# Patient Record
Sex: FEMALE | Race: WHITE | NOT HISPANIC OR LATINO | Employment: FULL TIME | URBAN - METROPOLITAN AREA
[De-identification: names, ages, dates, MRNs, and addresses within clinical notes are randomized per-mention and may not be internally consistent; named-entity substitution may affect disease eponyms.]

---

## 2017-09-20 ENCOUNTER — HOSPITAL ENCOUNTER (EMERGENCY)
Facility: HOSPITAL | Age: 32
Discharge: HOME | End: 2017-09-20
Attending: EMERGENCY MEDICINE
Payer: OTHER MISCELLANEOUS

## 2017-09-20 VITALS
SYSTOLIC BLOOD PRESSURE: 139 MMHG | TEMPERATURE: 98.9 F | DIASTOLIC BLOOD PRESSURE: 63 MMHG | OXYGEN SATURATION: 97 % | HEIGHT: 62 IN | BODY MASS INDEX: 23.55 KG/M2 | HEART RATE: 95 BPM | RESPIRATION RATE: 18 BRPM | WEIGHT: 128 LBS

## 2017-09-20 DIAGNOSIS — H57.89 IRRITATION OF RIGHT EYE: Primary | ICD-10-CM

## 2017-09-20 PROCEDURE — 99283 EMERGENCY DEPT VISIT LOW MDM: CPT

## 2017-09-20 RX ORDER — CIPROFLOXACIN HYDROCHLORIDE 3.5 MG/ML
2 SOLUTION/ DROPS TOPICAL EVERY 4 HOURS
Qty: 5 ML | Refills: 0 | Status: SHIPPED | OUTPATIENT
Start: 2017-09-20 | End: 2017-09-27

## 2019-07-19 ENCOUNTER — TRANSCRIBE ORDERS (OUTPATIENT)
Dept: ADMINISTRATIVE | Facility: HOSPITAL | Age: 34
End: 2019-07-19

## 2019-07-19 ENCOUNTER — APPOINTMENT (OUTPATIENT)
Dept: LAB | Facility: HOSPITAL | Age: 34
End: 2019-07-19
Attending: INTERNAL MEDICINE
Payer: COMMERCIAL

## 2019-07-19 DIAGNOSIS — R79.89 HYPOURICEMIA: Primary | ICD-10-CM

## 2019-07-19 DIAGNOSIS — R79.89 HYPOURICEMIA: ICD-10-CM

## 2019-07-19 LAB
BACTERIA UR QL AUTO: ABNORMAL /HPF
BILIRUB UR QL STRIP: NEGATIVE
CLARITY UR: CLEAR
COLOR UR: YELLOW
GLUCOSE UR STRIP-MCNC: NEGATIVE MG/DL
HGB UR QL STRIP.AUTO: NEGATIVE
KETONES UR STRIP-MCNC: NEGATIVE MG/DL
LEUKOCYTE ESTERASE UR QL STRIP: ABNORMAL
NITRITE UR QL STRIP: NEGATIVE
NON-SQ EPI CELLS URNS QL MICRO: ABNORMAL /HPF
PH UR STRIP.AUTO: 7.5 [PH]
PROT UR STRIP-MCNC: NEGATIVE MG/DL
RBC #/AREA URNS AUTO: ABNORMAL /HPF
SP GR UR STRIP.AUTO: <=1.005 (ref 1–1.03)
UROBILINOGEN UR QL STRIP.AUTO: 0.2 E.U./DL
WBC #/AREA URNS AUTO: ABNORMAL /HPF

## 2019-07-19 PROCEDURE — 84166 PROTEIN E-PHORESIS/URINE/CSF: CPT | Performed by: PATHOLOGY

## 2019-07-19 PROCEDURE — 84165 PROTEIN E-PHORESIS SERUM: CPT | Performed by: PATHOLOGY

## 2019-07-19 PROCEDURE — 81001 URINALYSIS AUTO W/SCOPE: CPT | Performed by: INTERNAL MEDICINE

## 2019-07-19 PROCEDURE — 84166 PROTEIN E-PHORESIS/URINE/CSF: CPT | Performed by: INTERNAL MEDICINE

## 2019-07-19 PROCEDURE — 84165 PROTEIN E-PHORESIS SERUM: CPT

## 2019-07-19 PROCEDURE — 36415 COLL VENOUS BLD VENIPUNCTURE: CPT

## 2019-07-23 LAB
ALBUMIN SERPL ELPH-MCNC: 4.34 G/DL (ref 3.5–5)
ALBUMIN SERPL ELPH-MCNC: 65.7 % (ref 52–65)
ALBUMIN UR ELPH-MCNC: 100 %
ALPHA1 GLOB MFR UR ELPH: 0 %
ALPHA1 GLOB SERPL ELPH-MCNC: 0.3 G/DL (ref 0.1–0.4)
ALPHA1 GLOB SERPL ELPH-MCNC: 4.5 % (ref 2.5–5)
ALPHA2 GLOB MFR UR ELPH: 0 %
ALPHA2 GLOB SERPL ELPH-MCNC: 0.63 G/DL (ref 0.4–1.2)
ALPHA2 GLOB SERPL ELPH-MCNC: 9.6 % (ref 7–13)
B-GLOBULIN MFR UR ELPH: 0 %
BETA GLOB ABNORMAL SERPL ELPH-MCNC: 0.38 G/DL (ref 0.4–0.8)
BETA1 GLOB SERPL ELPH-MCNC: 5.8 % (ref 5–13)
BETA2 GLOB SERPL ELPH-MCNC: 3.2 % (ref 2–8)
BETA2+GAMMA GLOB SERPL ELPH-MCNC: 0.21 G/DL (ref 0.2–0.5)
GAMMA GLOB ABNORMAL SERPL ELPH-MCNC: 0.74 G/DL (ref 0.5–1.6)
GAMMA GLOB MFR UR ELPH: 0 %
GAMMA GLOB SERPL ELPH-MCNC: 11.2 % (ref 12–22)
IGG/ALB SER: 1.92 {RATIO} (ref 1.1–1.8)
PROT PATTERN SERPL ELPH-IMP: ABNORMAL
PROT PATTERN UR ELPH-IMP: NORMAL
PROT SERPL-MCNC: 6.6 G/DL (ref 6.4–8.2)
PROT UR-MCNC: 6 MG/DL

## 2019-08-22 ENCOUNTER — APPOINTMENT (OUTPATIENT)
Dept: LAB | Facility: HOSPITAL | Age: 34
End: 2019-08-22
Attending: INTERNAL MEDICINE
Payer: COMMERCIAL

## 2019-08-22 ENCOUNTER — TRANSCRIBE ORDERS (OUTPATIENT)
Dept: ADMINISTRATIVE | Facility: HOSPITAL | Age: 34
End: 2019-08-22

## 2019-08-22 DIAGNOSIS — R31.21 ASYMPTOMATIC MICROSCOPIC HEMATURIA: Primary | ICD-10-CM

## 2019-08-22 DIAGNOSIS — R31.21 ASYMPTOMATIC MICROSCOPIC HEMATURIA: ICD-10-CM

## 2019-08-22 LAB
BILIRUB UR QL STRIP: NEGATIVE
CLARITY UR: CLEAR
COLOR UR: YELLOW
GLUCOSE UR STRIP-MCNC: NEGATIVE MG/DL
HGB UR QL STRIP.AUTO: NEGATIVE
KETONES UR STRIP-MCNC: NEGATIVE MG/DL
LEUKOCYTE ESTERASE UR QL STRIP: NEGATIVE
NITRITE UR QL STRIP: NEGATIVE
PH UR STRIP.AUTO: 8 [PH]
PROT UR STRIP-MCNC: NEGATIVE MG/DL
SP GR UR STRIP.AUTO: 1.01 (ref 1–1.03)
UROBILINOGEN UR QL STRIP.AUTO: 0.2 E.U./DL

## 2019-08-22 PROCEDURE — 87086 URINE CULTURE/COLONY COUNT: CPT

## 2019-08-22 PROCEDURE — 81003 URINALYSIS AUTO W/O SCOPE: CPT | Performed by: INTERNAL MEDICINE

## 2019-08-23 LAB — BACTERIA UR CULT: NORMAL

## 2019-09-30 ENCOUNTER — TELEPHONE (OUTPATIENT)
Dept: HEMATOLOGY ONCOLOGY | Facility: HOSPITAL | Age: 34
End: 2019-09-30

## 2019-09-30 DIAGNOSIS — D80.1 AGAMMAGLOBULINEMIA (HCC): Primary | ICD-10-CM

## 2019-09-30 NOTE — TELEPHONE ENCOUNTER
Called but I couldn't reach the pt as no one picked up and no voice mail was set up  I called to see if the patient can have labs drawn prior to her appt with Dr Alfie Sesay tomorrow 10/1  Please noted we will not reschedule her appt as she is a new pt

## 2019-10-01 ENCOUNTER — CONSULT (OUTPATIENT)
Dept: HEMATOLOGY ONCOLOGY | Facility: CLINIC | Age: 34
End: 2019-10-01
Payer: COMMERCIAL

## 2019-10-01 VITALS
RESPIRATION RATE: 16 BRPM | HEART RATE: 82 BPM | DIASTOLIC BLOOD PRESSURE: 76 MMHG | OXYGEN SATURATION: 99 % | BODY MASS INDEX: 26.5 KG/M2 | HEIGHT: 62 IN | SYSTOLIC BLOOD PRESSURE: 114 MMHG | TEMPERATURE: 99.7 F | WEIGHT: 144 LBS

## 2019-10-01 DIAGNOSIS — D50.0 IRON DEFICIENCY ANEMIA DUE TO CHRONIC BLOOD LOSS: Primary | ICD-10-CM

## 2019-10-01 PROCEDURE — 99245 OFF/OP CONSLTJ NEW/EST HI 55: CPT | Performed by: INTERNAL MEDICINE

## 2019-10-01 RX ORDER — DIPHENOXYLATE HYDROCHLORIDE AND ATROPINE SULFATE 2.5; .025 MG/1; MG/1
1 TABLET ORAL DAILY
COMMUNITY

## 2019-10-01 NOTE — PROGRESS NOTES
Oncology Outpatient Consult Note  Gerard Nicole 29 y o  female MRN: @ Encounter: [id number]        Date:  10/1/2019        CC:  Abnormal A by G ratio  HPI:  Gerard Nicole is seen for initial consultation 10/1/2019 regarding An abnormal albumin by globulin ratio  She had an SPEP with immunofixation along with a UPEP with immunofixation which showed no M spike  She has not had a CBC with differential or CMP  She has also not had any of the other accompanying blood work  She states her blood work was drawn because she was fatigued  Denies any nausea denies any vomiting  Denies any bone pain  Apart from fatigue she really has no other complaints and the rest of her 14 point review of systems today was negative  Test Results:      Lab Results   Component Value Date    SPEP  07/19/2019     No monoclonal bands noted  Reviewed by: Any Rinaldi MD, PhD ([id number]) **Electronic Signature**    UPEP  07/19/2019     No monoclonal bands noted  Reviewed by: Any Rinaldi MD, PhD ([id number])  **Electronic Signature**       ROS: As stated in history of present illness otherwise her 14 point review of systems today was negative          Active Problems: Vitamin D Deficiency    Past Medical: Noncontributory    Surgical History: Non contributory    Family History:  Noncontributory    Social History:   Social History     Socioeconomic History    Marital status: Single     Spouse name: Not on file    Number of children: Not on file    Years of education: Not on file    Highest education level: Not on file   Occupational History    Not on file   Social Needs    Financial resource strain: Not on file    Food insecurity:     Worry: Not on file     Inability: Not on file    Transportation needs:     Medical: Not on file     Non-medical: Not on file   Tobacco Use    Smoking status: Never Smoker    Smokeless tobacco: Never Used   Substance and Sexual Activity    Alcohol use: No    Drug use: Not on file    Sexual activity: Not on file   Lifestyle    Physical activity:     Days per week: Not on file     Minutes per session: Not on file    Stress: Not on file   Relationships    Social connections:     Talks on phone: Not on file     Gets together: Not on file     Attends [employer] service: Not on file     Active member of club or organization: Not on file     Attends meetings of clubs or organizations: Not on file     Relationship status: Not on file    Intimate partner violence:     Fear of current or ex partner: Not on file     Emotionally abused: Not on file     Physically abused: Not on file     Forced sexual activity: Not on file   Other Topics Concern    Not on file   Social History Narrative    Not on file       Current Medications:   Current Outpatient Medications   Medication Sig Dispense Refill    cholecalciferol (VITAMIN D3) 1,000 units tablet Vitamin D   800 units daily      cyanocobalamin ([employer] VITAMIN B-12) 1000 MCG tablet Take 100 mcg by mouth daily      multivitamin (THERAGRAN) TABS Take 1 tablet by mouth daily       No current facility-administered medications for this visit  Allergies: No Known Allergies      Physical Exam:    Body surface area is 1 66 meters squared  Wt Readings from Last 3 Encounters:   10/01/19 65 3 kg (144 lb)   09/20/17 58 1 kg (128 lb)   04/10/15 73 3 kg (161 lb 8 oz)        Temp Readings from Last 3 Encounters:   10/01/19 99 7 °F (37 6 °C) (Oral)   09/20/17 98 9 °F (37 2 °C) (Oral)        BP Readings from Last 3 Encounters:   10/01/19 114/76   09/20/17 139/63   04/10/15 122/68         Pulse Readings from Last 3 Encounters:   10/01/19 82   09/20/17 95       Physical Exam     Constitutional   General appearance: No acute distress, well appearing and well nourished  Eyes   Conjunctiva and lids: No swelling, erythema or discharge  Pupils and irises: Equal, round and reactive to light      Ears, Nose, Mouth, and Throat   External inspection of ears and nose: Normal  Nasal mucosa, septum, and turbinates: Normal without edema or erythema  Oropharynx: Normal with no erythema, edema, exudate or lesions  Pulmonary   Respiratory effort: No increased work of breathing or signs of respiratory distress  Auscultation of lungs: Clear to auscultation  Cardiovascular   Palpation of heart: Normal PMI, no thrills  Auscultation of heart: Normal rate and rhythm, normal S1 and S2, without murmurs  Examination of extremities for edema and/or varicosities: Normal     Carotid pulses: Normal     Abdomen   Abdomen: Non-tender, no masses  Liver and spleen: No hepatomegaly or splenomegaly  Lymphatic   Palpation of lymph nodes in neck: No lymphadenopathy  Musculoskeletal   Gait and station: Normal     Digits and nails: Normal without clubbing or cyanosis  Inspection/palpation of joints, bones, and muscles: Normal     Skin   Skin and subcutaneous tissue: Normal without rashes or lesions  Neurologic   Cranial nerves: Cranial nerves 2-12 intact  Sensation: No sensory loss  Psychiatric   Orientation to person, place, and time: Normal     Mood and affect: Normal           Assessment/ Plan:    The patient is a pleasant 26-year-old female who was referred to see us for a slightly abnormal serum protein electrophoresis with an elevated albumin by globulin ratio  The patient's albumin was slightly elevated at 65 7  Urine protein electrophoresis and serum protein PHERESIS did not show an M spike  I do not have a baseline CBC or CMP  I will order this  I will also order a myeloma workup and iron studies because the patient is a young woman with fatigue  She states she is going to see a rheumatologist soon also  If the testing comes back negative we will discharge her back to the care of her primary care physician  If however the immunoglobulin levels or free light chain levels show any abnormalities we may consider a bone marrow biopsy   My clinical suspicion for hematologic disorder is low  I will however wait for the bloodwork  Iron study should help delineate if any of her fatigue is secondary to iron deficiency  The patient and her mother are in agreement with the plan  I'll see her back in 4 weeks with results of blood work  Goals and Barriers:  Current Goal: Prolong Survival from Slightly elevated albumin  Barriers: None  Patient's Capacity to Self Care:  Patient able to self care  Portions of the record may have been created with voice recognition software   Occasional wrong word or "sound a like" substitutions may have occurred due to the inherent limitations of voice recognition software   Read the chart carefully and recognize, using context, where substitutions have occurred

## 2019-10-18 ENCOUNTER — OFFICE VISIT (OUTPATIENT)
Dept: DERMATOLOGY | Facility: CLINIC | Age: 34
End: 2019-10-18
Payer: COMMERCIAL

## 2019-10-18 DIAGNOSIS — Z13.89 SCREENING FOR SKIN CONDITION: ICD-10-CM

## 2019-10-18 DIAGNOSIS — D22.9 NEVUS: Primary | ICD-10-CM

## 2019-10-18 PROCEDURE — 88305 TISSUE EXAM BY PATHOLOGIST: CPT | Performed by: PATHOLOGY

## 2019-10-18 PROCEDURE — 99203 OFFICE O/P NEW LOW 30 MIN: CPT | Performed by: DERMATOLOGY

## 2019-10-18 PROCEDURE — 11310 SHAVE SKIN LESION 0.5 CM/<: CPT | Performed by: DERMATOLOGY

## 2019-10-18 NOTE — PATIENT INSTRUCTIONS
Nevi reviewed the concept of ABCDE and ugly duckling nothing markedly atypical patient reassured  Mole on cutaneous lip was removed because the patient concern and change  Screening for Dermatologic Disorders: Nothing else of concern noted on complete exam follow up in 1 year   Wound care instructions given to patient

## 2019-10-18 NOTE — PROGRESS NOTES
[facility] DERMATOLOGY  [address] [place] [id number]  [phone]  [phone]     MRN: [id number] : 1985  Encounter: [id number]  Patient Information: Kenya Edmonds  Chief complaint:  Growing mole an overall skin check    History of present illness:  A 60-year-old female presents for overall skin check for concerns regarding moles and growths patient concerned regarding 1 on her left cutaneous lip that has been present for awhile that appears to be getting larger  No past medical history on file  No past surgical history on file  Social History   Social History     Substance and Sexual Activity   Alcohol Use No     Social History     Substance and Sexual Activity   Drug Use Not on file     Social History     Tobacco Use   Smoking Status Never Smoker   Smokeless Tobacco Never Used     No family history on file  Meds/Allergies   No Known Allergies    Meds:  Prior to Admission medications    Medication Sig Start Date End Date Taking?  Authorizing Provider   cholecalciferol (VITAMIN D3) 1,000 units tablet Vitamin D   800 units daily   Yes Historical Provider, MD   cyanocobalamin ([employer] VITAMIN B-12) 1000 MCG tablet Take 100 mcg by mouth daily   Yes Historical Provider, MD   multivitamin (THERAGRAN) TABS Take 1 tablet by mouth daily   Yes Historical Provider, MD       Subjective:     Review of Systems:    General: negative for - chills, fatigue, fever,  weight gain or weight loss  Psychological: negative for - anxiety, behavioral disorder, concentration difficulties, decreased libido, depression, irritability, memory difficulties, mood swings, sleep disturbances or suicidal ideation  ENT: negative for - hearing difficulties , nasal congestion, nasal discharge, oral lesions, sinus pain, sneezing, sore throat  Allergy and Immunology: negative for - hives, insect bite sensitivity,  Hematological and Lymphatic: negative for - bleeding problems, blood clots,bruising, swollen lymph nodes  Endocrine: negative for - hair pattern changes, hot flashes, malaise/lethargy, mood swings, palpitations, polydipsia/polyuria, skin changes, temperature intolerance or unexpected weight change  Respiratory: negative for - cough, hemoptysis, orthopnea, shortness of breath, or wheezing  Cardiovascular: negative for - chest pain, dyspnea on exertion, edema,  Gastrointestinal: negative for - abdominal pain, nausea/vomiting  Genito-Urinary: negative for - dysuria, incontinence, irregular/heavy menses or urinary frequency/urgency  Musculoskeletal: negative for - gait disturbance, joint pain, joint stiffness, joint swelling, muscle pain, muscular weakness  Dermatological:  As in HPI  Neurological: negative for confusion, dizziness, headaches, impaired coordination/balance, memory loss, numbness/tingling, seizures, speech problems, tremors or weakness       Objective: There were no vitals taken for this visit  Physical Exam:    General Appearance:    Alert, cooperative, no distress   Head:    Normocephalic, without obvious abnormality, atraumatic           Skin:   A full skin exam was performed including scalp, head scalp, eyes, ears, nose, lips, neck, chest, axilla, abdomen, back, buttocks, bilateral upper extremities, bilateral lower extremities, hands, feet, fingers, toes, fingernails, and toenails normal pigmented lesions all regular shape and color 5 mm pigmented area with some erythema noted on the left cutaneous lip nothing else remarkable noted on complete exam      Shave excision Procedure Note    Pre-operative Diagnosis:  Irritated nevus    Plan:  1  Instructed to keep the wound dry and covered for 24 and clean thereafter  2  Warning signs of infection were reviewed  3  Recommended that the patient use OTC acetaminophen as needed for pain       Locations:  Left cutaneous lip  Indications:  Growing lesion    Anesthesia: Lidocaine 1% with epinephrine without added sodium bicarbonate    Procedure Details     Patient informed of the risks (including bleeding and infection) and benefits of the   procedure and Verbal informed consent obtained  The lesion and surrounding area were prepped using alcohol  A Blue blade razor was used to remove the lesion  Hemostasis achieved with aluminum chloride  Petrolatum and a sterile dressing applied  The specimen was sent for pathologic examination  The patient tolerated the procedure(s) well  Complications:  none  Assessment:     1  Nevus     2  Screening for skin condition           Plan:   Nevi reviewed the concept of ABCDE and ugly duckling nothing markedly atypical patient reassured  Mole on cutaneous lip was removed because the patient concern and change  Screening for Dermatologic Disorders: Nothing else of concern noted on complete exam follow up in 1 year       Kory Rojas MD  10/18/2019,12:44 PM    Portions of the record may have been created with voice recognition software   Occasional wrong word or "sound a like" substitutions may have occurred due to the inherent limitations of voice recognition software   Read the chart carefully and recognize, using context, where substitutions have occurred

## 2019-10-22 ENCOUNTER — APPOINTMENT (OUTPATIENT)
Dept: LAB | Facility: HOSPITAL | Age: 34
End: 2019-10-22
Attending: INTERNAL MEDICINE
Payer: COMMERCIAL

## 2019-10-22 ENCOUNTER — TELEPHONE (OUTPATIENT)
Dept: DERMATOLOGY | Facility: CLINIC | Age: 34
End: 2019-10-22

## 2019-10-22 DIAGNOSIS — D80.1 AGAMMAGLOBULINEMIA (HCC): ICD-10-CM

## 2019-10-22 DIAGNOSIS — D50.0 IRON DEFICIENCY ANEMIA DUE TO CHRONIC BLOOD LOSS: ICD-10-CM

## 2019-10-22 LAB
ALBUMIN SERPL BCP-MCNC: 3.8 G/DL (ref 3.5–5)
ALP SERPL-CCNC: 41 U/L (ref 46–116)
ALT SERPL W P-5'-P-CCNC: 17 U/L (ref 12–78)
ANION GAP SERPL CALCULATED.3IONS-SCNC: 9 MMOL/L (ref 4–13)
AST SERPL W P-5'-P-CCNC: 7 U/L (ref 5–45)
BASOPHILS # BLD AUTO: 0.05 THOUSANDS/ΜL (ref 0–0.1)
BASOPHILS NFR BLD AUTO: 1 % (ref 0–1)
BILIRUB SERPL-MCNC: 0.4 MG/DL (ref 0.2–1)
BUN SERPL-MCNC: 10 MG/DL (ref 5–25)
CALCIUM SERPL-MCNC: 8.8 MG/DL (ref 8.3–10.1)
CHLORIDE SERPL-SCNC: 104 MMOL/L (ref 100–108)
CO2 SERPL-SCNC: 28 MMOL/L (ref 21–32)
CREAT SERPL-MCNC: 0.7 MG/DL (ref 0.6–1.3)
EOSINOPHIL # BLD AUTO: 0 THOUSAND/ΜL (ref 0–0.61)
EOSINOPHIL NFR BLD AUTO: 0 % (ref 0–6)
ERYTHROCYTE [DISTWIDTH] IN BLOOD BY AUTOMATED COUNT: 12.6 % (ref 11.6–15.1)
FERRITIN SERPL-MCNC: 41 NG/ML (ref 8–388)
GFR SERPL CREATININE-BSD FRML MDRD: 113 ML/MIN/1.73SQ M
GLUCOSE P FAST SERPL-MCNC: 90 MG/DL (ref 65–99)
HCT VFR BLD AUTO: 43.1 % (ref 34.8–46.1)
HGB BLD-MCNC: 14 G/DL (ref 11.5–15.4)
IGA SERPL-MCNC: 82 MG/DL (ref 70–400)
IGG SERPL-MCNC: 656 MG/DL (ref 700–1600)
IGM SERPL-MCNC: 131 MG/DL (ref 40–230)
IMM GRANULOCYTES # BLD AUTO: 0.02 THOUSAND/UL (ref 0–0.2)
IMM GRANULOCYTES NFR BLD AUTO: 0 % (ref 0–2)
IRON SATN MFR SERPL: 22 %
IRON SERPL-MCNC: 66 UG/DL (ref 50–170)
LYMPHOCYTES # BLD AUTO: 1.72 THOUSANDS/ΜL (ref 0.6–4.47)
LYMPHOCYTES NFR BLD AUTO: 32 % (ref 14–44)
MCH RBC QN AUTO: 30.8 PG (ref 26.8–34.3)
MCHC RBC AUTO-ENTMCNC: 32.5 G/DL (ref 31.4–37.4)
MCV RBC AUTO: 95 FL (ref 82–98)
MONOCYTES # BLD AUTO: 0.43 THOUSAND/ΜL (ref 0.17–1.22)
MONOCYTES NFR BLD AUTO: 8 % (ref 4–12)
NEUTROPHILS # BLD AUTO: 3.18 THOUSANDS/ΜL (ref 1.85–7.62)
NEUTS SEG NFR BLD AUTO: 59 % (ref 43–75)
NRBC BLD AUTO-RTO: 0 /100 WBCS
PLATELET # BLD AUTO: 199 THOUSANDS/UL (ref 149–390)
PMV BLD AUTO: 12.6 FL (ref 8.9–12.7)
POTASSIUM SERPL-SCNC: 4.2 MMOL/L (ref 3.5–5.3)
PROT SERPL-MCNC: 6.8 G/DL (ref 6.4–8.2)
RBC # BLD AUTO: 4.54 MILLION/UL (ref 3.81–5.12)
SODIUM SERPL-SCNC: 141 MMOL/L (ref 136–145)
TIBC SERPL-MCNC: 306 UG/DL (ref 250–450)
WBC # BLD AUTO: 5.4 THOUSAND/UL (ref 4.31–10.16)

## 2019-10-22 PROCEDURE — 83550 IRON BINDING TEST: CPT

## 2019-10-22 PROCEDURE — 83540 ASSAY OF IRON: CPT

## 2019-10-22 PROCEDURE — 84165 PROTEIN E-PHORESIS SERUM: CPT | Performed by: INTERNAL MEDICINE

## 2019-10-22 PROCEDURE — 82784 ASSAY IGA/IGD/IGG/IGM EACH: CPT

## 2019-10-22 PROCEDURE — 82232 ASSAY OF BETA-2 PROTEIN: CPT | Performed by: INTERNAL MEDICINE

## 2019-10-22 PROCEDURE — 36415 COLL VENOUS BLD VENIPUNCTURE: CPT | Performed by: INTERNAL MEDICINE

## 2019-10-22 PROCEDURE — 85025 COMPLETE CBC W/AUTO DIFF WBC: CPT | Performed by: INTERNAL MEDICINE

## 2019-10-22 PROCEDURE — 83883 ASSAY NEPHELOMETRY NOT SPEC: CPT | Performed by: INTERNAL MEDICINE

## 2019-10-22 PROCEDURE — 80053 COMPREHEN METABOLIC PANEL: CPT | Performed by: INTERNAL MEDICINE

## 2019-10-22 PROCEDURE — 82728 ASSAY OF FERRITIN: CPT

## 2019-10-22 PROCEDURE — 83918 ORGANIC ACIDS TOTAL QUANT: CPT

## 2019-10-22 PROCEDURE — 84165 PROTEIN E-PHORESIS SERUM: CPT | Performed by: PATHOLOGY

## 2019-10-22 NOTE — TELEPHONE ENCOUNTER
----- Message from Génesis Ivy MD sent at 10/22/2019 12:53 PM EDT -----  Please call her of normal pathology

## 2019-10-23 LAB
KAPPA LC FREE SER-MCNC: 8.6 MG/L (ref 3.3–19.4)
KAPPA LC FREE/LAMBDA FREE SER: 0.82 {RATIO} (ref 0.26–1.65)
LAMBDA LC FREE SERPL-MCNC: 10.5 MG/L (ref 5.7–26.3)

## 2019-10-24 LAB
ALBUMIN SERPL ELPH-MCNC: 4.33 G/DL (ref 3.5–5)
ALBUMIN SERPL ELPH-MCNC: 64.7 % (ref 52–65)
ALPHA1 GLOB SERPL ELPH-MCNC: 0.31 G/DL (ref 0.1–0.4)
ALPHA1 GLOB SERPL ELPH-MCNC: 4.7 % (ref 2.5–5)
ALPHA2 GLOB SERPL ELPH-MCNC: 0.64 G/DL (ref 0.4–1.2)
ALPHA2 GLOB SERPL ELPH-MCNC: 9.6 % (ref 7–13)
B2 MICROGLOB SERPL-MCNC: 1.3 MG/L (ref 0.6–2.4)
BETA GLOB ABNORMAL SERPL ELPH-MCNC: 0.4 G/DL (ref 0.4–0.8)
BETA1 GLOB SERPL ELPH-MCNC: 6 % (ref 5–13)
BETA2 GLOB SERPL ELPH-MCNC: 3.4 % (ref 2–8)
BETA2+GAMMA GLOB SERPL ELPH-MCNC: 0.23 G/DL (ref 0.2–0.5)
GAMMA GLOB ABNORMAL SERPL ELPH-MCNC: 0.78 G/DL (ref 0.5–1.6)
GAMMA GLOB SERPL ELPH-MCNC: 11.6 % (ref 12–22)
IGG/ALB SER: 1.83 {RATIO} (ref 1.1–1.8)
METHYLMALONATE SERPL-SCNC: 89 NMOL/L (ref 0–378)
PROT PATTERN SERPL ELPH-IMP: ABNORMAL
PROT SERPL-MCNC: 6.7 G/DL (ref 6.4–8.2)
SL AMB DISCLAIMER: NORMAL

## 2019-11-05 ENCOUNTER — OFFICE VISIT (OUTPATIENT)
Dept: HEMATOLOGY ONCOLOGY | Facility: CLINIC | Age: 34
End: 2019-11-05
Payer: COMMERCIAL

## 2019-11-05 VITALS
BODY MASS INDEX: 26.87 KG/M2 | HEART RATE: 96 BPM | TEMPERATURE: 99.1 F | DIASTOLIC BLOOD PRESSURE: 74 MMHG | SYSTOLIC BLOOD PRESSURE: 106 MMHG | RESPIRATION RATE: 16 BRPM | WEIGHT: 146 LBS | HEIGHT: 62 IN | OXYGEN SATURATION: 99 %

## 2019-11-05 DIAGNOSIS — D80.1 AGAMMAGLOBULINEMIA (HCC): Primary | ICD-10-CM

## 2019-11-05 PROCEDURE — 99214 OFFICE O/P EST MOD 30 MIN: CPT | Performed by: INTERNAL MEDICINE

## 2019-11-05 NOTE — PROGRESS NOTES
Hematology/Oncology Outpatient Follow- up Note  Kiatlynn Robledo 29 y o  female MRN: @ Encounter: [id number]        Date:  11/5/2019    Presenting Complaint/Diagnosis : Abnormal A by G ratio  HPI:    Kaitlynn Robledo is seen for initial consultation 10/1/2019 regarding An abnormal albumin by globulin ratio  She had an SPEP with immunofixation along with a UPEP with immunofixation which showed no M spike  She has not had a CBC with differential or CMP  She has also not had any of the other accompanying blood work  She states her blood work was drawn because she was fatigued  Denies any nausea denies any vomiting  Denies any bone pain  Previous Hematologic/ Oncologic History:    Workup    Current Hematologic/ Oncologic Treatment:    Workup    Interval History:    The patient returns for follow-up visit  She was referred to see us for a slightly abnormal albumin by globulin ratio  We initiated a myeloma workup  She has no M spike  Immunoglobulin levels show her IgG is very slightly low at 646 with the normal being 700  Hemoglobin, calcium and creatinine were all normal  Free light chain ratio was normal  Beta-2 microglobulin was normal  She has no evidence clinically of myeloma and her blood work does not support this  As far as symptoms are concerned she is slightly fatigued  Is otherwise at baseline  Iron studies were on the low side of normal  I advised her she can take an iron pill daily if she wishes and this will possibly help with her fatigue  Denies any other complaints and the rest of her 14 point review of systems today was negative  Test Results:    Imaging: No results found      Labs:   Lab Results   Component Value Date    WBC 5 40 10/22/2019    HGB 14 0 10/22/2019    HCT 43 1 10/22/2019    MCV 95 10/22/2019     10/22/2019     Lab Results   Component Value Date    K 4 2 10/22/2019     10/22/2019    CO2 28 10/22/2019    BUN 10 10/22/2019    CREATININE 0 70 10/22/2019    GLUF 90 10/22/2019 CALCIUM 8 8 10/22/2019    AST 7 10/22/2019    ALT 17 10/22/2019    ALKPHOS 41 (L) 10/22/2019    EGFR 113 10/22/2019         Lab Results   Component Value Date    SPEP  10/22/2019     No monoclonal bands noted  Reviewed by: Webb Canavan, DO **Electronic Signature**    UPEP  07/19/2019     No monoclonal bands noted  Reviewed by: Lizzy Camp MD, PhD ([id number])  **Electronic Signature**       Lab Results   Component Value Date    IRON 66 10/22/2019    TIBC 306 10/22/2019    FERRITIN 41 10/22/2019     ROS: As stated in the history of present illness otherwise his 14 point review of systems today was negative        Active Problems: Fatigue    Past Medical History: Vitamin D deficiency    Surgical History: Noncontributory    Family History:  Noncontributory      Social History:   Social History     Socioeconomic History    Marital status: Single     Spouse name: Not on file    Number of children: Not on file    Years of education: Not on file    Highest education level: Not on file   Occupational History    Not on file   Social Needs    Financial resource strain: Not on file    Food insecurity:     Worry: Not on file     Inability: Not on file    Transportation needs:     Medical: Not on file     Non-medical: Not on file   Tobacco Use    Smoking status: Never Smoker    Smokeless tobacco: Never Used   Substance and Sexual Activity    Alcohol use: No    Drug use: Not on file    Sexual activity: Not on file   Lifestyle    Physical activity:     Days per week: Not on file     Minutes per session: Not on file    Stress: Not on file   Relationships    Social connections:     Talks on phone: Not on file     Gets together: Not on file     Attends [employer]od[employer] service: Not on file     Active member of club or organization: Not on file     Attends meetings of clubs or organizations: Not on file     Relationship status: Not on file    Intimate partner violence:     Fear of current or ex partner: Not on file     Emotionally abused: Not on file     Physically abused: Not on file     Forced sexual activity: Not on file   Other Topics Concern    Not on file   Social History Narrative    Not on file       Current Medications:   Current Outpatient Medications   Medication Sig Dispense Refill    cholecalciferol (VITAMIN D3) 1,000 units tablet Vitamin D   800 units daily      cyanocobalamin ([employer] VITAMIN B-12) 1000 MCG tablet Take 100 mcg by mouth daily      multivitamin (THERAGRAN) TABS Take 1 tablet by mouth daily       No current facility-administered medications for this visit  Allergies: No Known Allergies    Physical Exam:    Body surface area is 1 67 meters squared  Wt Readings from Last 3 Encounters:   11/05/19 66 2 kg (146 lb)   10/01/19 65 3 kg (144 lb)   09/20/17 58 1 kg (128 lb)        Temp Readings from Last 3 Encounters:   11/05/19 99 1 °F (37 3 °C) (Oral)   10/01/19 99 7 °F (37 6 °C) (Oral)   09/20/17 98 9 °F (37 2 °C) (Oral)        BP Readings from Last 3 Encounters:   11/05/19 106/74   10/01/19 114/76   09/20/17 139/63         Pulse Readings from Last 3 Encounters:   11/05/19 96   10/01/19 82   09/20/17 95         Physical Exam     Constitutional   General appearance: No acute distress, well appearing and well nourished  Eyes   Conjunctiva and lids: No swelling, erythema or discharge  Pupils and irises: Equal, round and reactive to light  Ears, Nose, Mouth, and Throat   External inspection of ears and nose: Normal     Nasal mucosa, septum, and turbinates: Normal without edema or erythema  Oropharynx: Normal with no erythema, edema, exudate or lesions  Pulmonary   Respiratory effort: No increased work of breathing or signs of respiratory distress  Auscultation of lungs: Clear to auscultation  Cardiovascular   Palpation of heart: Normal PMI, no thrills  Auscultation of heart: Normal rate and rhythm, normal S1 and S2, without murmurs      Examination of extremities for edema and/or varicosities: Normal     Carotid pulses: Normal     Abdomen   Abdomen: Non-tender, no masses  Liver and spleen: No hepatomegaly or splenomegaly  Lymphatic   Palpation of lymph nodes in neck: No lymphadenopathy  Musculoskeletal   Gait and station: Normal     Digits and nails: Normal without clubbing or cyanosis  Inspection/palpation of joints, bones, and muscles: Normal     Skin   Skin and subcutaneous tissue: Normal without rashes or lesions  Neurologic   Cranial nerves: Cranial nerves 2-12 intact  Sensation: No sensory loss  Psychiatric   Orientation to person, place, and time: Normal     Mood and affect: Normal         Assessment / Plan: This is a pleasant 40-year-old female who had an abnormal albumin by globulin ratio  Workup revealed no major abnormalities  The patient herself is doing well  She denies any major complaints  Myeloma workup was negative  IgG was slightly low but she is asymptomatic from this  I advised her she can follow with her primary care physician  We can see her as needed  She should continue to follow with her rheumatologist and do the blood tests they have recommended  I'll see her back as needed  She will follow with her other physicians  Goals and Barriers:  Current Goal:  Prolong Survival from Abnormal protein levels  Barriers: None  Patient's Capacity to Self Care:  Patient able to self care  Portions of the record may have been created with voice recognition software   Occasional wrong word or "sound a like" substitutions may have occurred due to the inherent limitations of voice recognition software   Read the chart carefully and recognize, using context, where substitutions have occurred